# Patient Record
Sex: MALE | Race: WHITE | NOT HISPANIC OR LATINO | Employment: OTHER | URBAN - METROPOLITAN AREA
[De-identification: names, ages, dates, MRNs, and addresses within clinical notes are randomized per-mention and may not be internally consistent; named-entity substitution may affect disease eponyms.]

---

## 2018-06-28 ENCOUNTER — TRANSFERRED RECORDS (OUTPATIENT)
Dept: HEALTH INFORMATION MANAGEMENT | Facility: CLINIC | Age: 28
End: 2018-06-28

## 2018-07-03 ENCOUNTER — TRANSFERRED RECORDS (OUTPATIENT)
Dept: HEALTH INFORMATION MANAGEMENT | Facility: CLINIC | Age: 28
End: 2018-07-03

## 2018-07-03 LAB
ALT SERPL-CCNC: 88 IU/L (ref 6–40)
AST SERPL-CCNC: 30 IU/L (ref 10–40)
CREAT SERPL-MCNC: 0.98 MG/DL (ref 0.7–1.2)
GLUCOSE SERPL-MCNC: 110 MG/DL (ref 70–100)
POTASSIUM SERPL-SCNC: 4.2 MEQ/L (ref 3.4–5.1)
TSH SERPL-ACNC: 2.07 UIU/ML (ref 0.4–3.99)

## 2018-07-04 ENCOUNTER — HOSPITAL ENCOUNTER (INPATIENT)
Facility: HOSPITAL | Age: 28
LOS: 6 days | Discharge: PSYCHIATRIC OR SUBSTANCE USE FACILITY | End: 2018-07-10
Attending: PSYCHIATRY & NEUROLOGY | Admitting: PSYCHIATRY & NEUROLOGY
Payer: MEDICAID

## 2018-07-04 PROBLEM — Z04.6 INVOLUNTARY COMMITMENT: Status: ACTIVE | Noted: 2018-07-04

## 2018-07-04 PROCEDURE — 12400011

## 2018-07-04 PROCEDURE — 12400000 ZZH R&B MH

## 2018-07-04 PROCEDURE — 25000132 ZZH RX MED GY IP 250 OP 250 PS 637: Performed by: NURSE PRACTITIONER

## 2018-07-04 RX ORDER — ALUMINA, MAGNESIA, AND SIMETHICONE 2400; 2400; 240 MG/30ML; MG/30ML; MG/30ML
30 SUSPENSION ORAL EVERY 4 HOURS PRN
Status: DISCONTINUED | OUTPATIENT
Start: 2018-07-04 | End: 2018-07-10 | Stop reason: HOSPADM

## 2018-07-04 RX ORDER — HYDROXYZINE HYDROCHLORIDE 25 MG/1
25-50 TABLET, FILM COATED ORAL EVERY 4 HOURS PRN
Status: DISCONTINUED | OUTPATIENT
Start: 2018-07-04 | End: 2018-07-10 | Stop reason: HOSPADM

## 2018-07-04 RX ORDER — OLANZAPINE 10 MG/1
10 TABLET ORAL 3 TIMES DAILY PRN
Status: DISCONTINUED | OUTPATIENT
Start: 2018-07-04 | End: 2018-07-10 | Stop reason: HOSPADM

## 2018-07-04 RX ORDER — QUETIAPINE FUMARATE 50 MG/1
50 TABLET, FILM COATED ORAL 3 TIMES DAILY PRN
Status: DISCONTINUED | OUTPATIENT
Start: 2018-07-04 | End: 2018-07-10 | Stop reason: HOSPADM

## 2018-07-04 RX ORDER — LEVOTHYROXINE SODIUM 25 UG/1
25 TABLET ORAL DAILY
Status: DISCONTINUED | OUTPATIENT
Start: 2018-07-04 | End: 2018-07-10 | Stop reason: HOSPADM

## 2018-07-04 RX ORDER — ACETAMINOPHEN 325 MG/1
650 TABLET ORAL EVERY 4 HOURS PRN
Status: DISCONTINUED | OUTPATIENT
Start: 2018-07-04 | End: 2018-07-10 | Stop reason: HOSPADM

## 2018-07-04 RX ORDER — ALBUTEROL SULFATE 90 UG/1
1 AEROSOL, METERED RESPIRATORY (INHALATION) EVERY 4 HOURS PRN
COMMUNITY

## 2018-07-04 RX ORDER — OLANZAPINE 10 MG/2ML
10 INJECTION, POWDER, FOR SOLUTION INTRAMUSCULAR 3 TIMES DAILY PRN
Status: DISCONTINUED | OUTPATIENT
Start: 2018-07-04 | End: 2018-07-10 | Stop reason: HOSPADM

## 2018-07-04 RX ORDER — ALBUTEROL SULFATE 90 UG/1
1 AEROSOL, METERED RESPIRATORY (INHALATION) EVERY 4 HOURS PRN
Status: DISCONTINUED | OUTPATIENT
Start: 2018-07-04 | End: 2018-07-10 | Stop reason: HOSPADM

## 2018-07-04 RX ORDER — BENZTROPINE MESYLATE 1 MG/1
1 TABLET ORAL 2 TIMES DAILY PRN
Status: DISCONTINUED | OUTPATIENT
Start: 2018-07-04 | End: 2018-07-10 | Stop reason: HOSPADM

## 2018-07-04 RX ADMIN — VITAMIN D, TAB 1000IU (100/BT) 2000 UNITS: 25 TAB at 12:47

## 2018-07-04 RX ADMIN — ACETAMINOPHEN 650 MG: 325 TABLET, FILM COATED ORAL at 02:01

## 2018-07-04 RX ADMIN — LEVOTHYROXINE SODIUM 25 MCG: 25 TABLET ORAL at 12:47

## 2018-07-04 ASSESSMENT — ACTIVITIES OF DAILY LIVING (ADL)
SWALLOWING: 0-->SWALLOWS FOODS/LIQUIDS WITHOUT DIFFICULTY
TOILETING: 0-->INDEPENDENT
COGNITION: 0 - NO COGNITION ISSUES REPORTED
SWALLOWING: 0-->SWALLOWS FOODS/LIQUIDS WITHOUT DIFFICULTY
GROOMING: INDEPENDENT
DRESS: 0-->INDEPENDENT
COGNITION: 0 - NO COGNITION ISSUES REPORTED
GROOMING: INDEPENDENT
TRANSFERRING: 0-->INDEPENDENT
DRESS: SCRUBS (BEHAVIORAL HEALTH)
DRESS: 0-->INDEPENDENT
LAUNDRY: UNABLE TO COMPLETE
AMBULATION: 0-->INDEPENDENT
DRESS: INDEPENDENT
TOILETING: 0-->INDEPENDENT
ORAL_HYGIENE: INDEPENDENT
AMBULATION: 0-->INDEPENDENT
RETIRED_COMMUNICATION: 0-->UNDERSTANDS/COMMUNICATES WITHOUT DIFFICULTY
BATHING: 0-->INDEPENDENT
TRANSFERRING: 0-->INDEPENDENT
BATHING: 0-->INDEPENDENT
ORAL_HYGIENE: INDEPENDENT
LAUNDRY: UNABLE TO COMPLETE
FALL_HISTORY_WITHIN_LAST_SIX_MONTHS: NO
RETIRED_EATING: 0-->INDEPENDENT
RETIRED_COMMUNICATION: 0-->UNDERSTANDS/COMMUNICATES WITHOUT DIFFICULTY
RETIRED_EATING: 0-->INDEPENDENT

## 2018-07-04 NOTE — IP AVS SNAPSHOT
[facility]    [address] [place] [id number]    Phone:  [phone]    Fax:  [phone]                                       After Visit Summary   7/4/2018    Arik Arredondo    MRN: [id number]           After Visit Summary Signature Page     I have received my discharge instructions, and my questions have been answered. I have discussed any challenges I see with this plan with the nurse or doctor.    ..........................................................................................................................................  Patient/Patient Representative Signature      ..........................................................................................................................................  Patient Representative Print Name and Relationship to Patient    ..................................................               ................................................  Date                                            Time    ..........................................................................................................................................  Reviewed by Signature/Title    ...................................................              ..............................................  Date                                                            Time
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
## 2018-07-04 NOTE — PLAN OF CARE
Face to face end of shift report received from Aleksander HASTINGS RN. Rounding completed. Patient observed in bed awake.     Una Menjivar  7/4/2018  4:03 PM

## 2018-07-04 NOTE — IP AVS SNAPSHOT
MRN:[id number]                      After Visit Summary   7/4/2018    Arik Arredondo    MRN: [id number]           Thank you!     Thank you for choosing [facility] for your care. Our goal is always to provide you with excellent care. Hearing back from our patients is one way we can continue to improve our services. Please take a few minutes to complete the written survey that you may receive in the mail after you visit with us. Thank you!        Patient Information     Date Of Birth          1990        Designated Caregiver       Most Recent Value    Caregiver    Will someone help with your care after discharge? yes    Name of designated caregiver Facility    Phone number of caregiver not sure at this point    Caregiver address unsure      About your hospital stay     You were admitted on:  July 4, 2018 You last received care in the: [facility]    You were discharged on:  July 10, 2018       Who to Call     For medical emergencies, please call 911.  For non-urgent questions about your medical care, please call your primary care provider or clinic, None          Attending Provider     Provider Specialty    Luis Cantu MD Psychiatry    Joann Moon NP Nurse Practitioner       Primary Care Provider Fax #    Physician No Ref-Primary [phone]      Further instructions from your care team       Behavioral Discharge Planning and Instructions    Summary: Arik was admitted to  after a revocation of his provisional discharge     Main Diagnosis: Schizoaffective disorder- bipolar type (by history), R/o substance-induced psychosis, Hx of opioid use disorder, Hx of marijuana use disorder    Major Treatments, Procedures and Findings: Stabilize with medications, connect with community programs.    Symptoms to Report: feeling more aggressive, increased confusion, losing more sleep, mood getting worse or thoughts of suicide    Lifestyle Adjustment: Take all medications as prescribed,  meet with doctor/ medication provider, out patient therapist, , and [facility] worker as scheduled. Abstain from alcohol or any unprescribed drugs.    IF YOU HAVE ANY QUESTIONS/CONCERNS REGARDING YOUR RECENT STAY AT [facility] PLEASE CALL [phone] AND ASK FOR A NURSE.    Psychiatry Follow-up:     [facility]  - Michelle - [phone] ext [phone]     - Heather Penfield  [address] [place] [address]  Phone: [phone]  Fax: [phone]    Resources:   Crisis Intervention: 344.316.8600 or 891-374-4156 (TTY: 158.790.8667).  Call anytime for help.  National [place] on Mental Illness (www.mn.héctor.org): 645.936.1057 or 152-773-9129.  Alcoholics Anonymous (www.alcoholics-anonymous.org): Check your phone book for your local chapter.  Suicide Awareness Voices of Education (SAVE) (www.save.org): 192-015-LRMX (9744)  National Suicide Prevention Line (www.mentalhealthmn.org): 512-277-GTDB (6184)  Mental Health Consumer/Survivor Network of [place] (www.mhcsn.net): 911.255.3285 or 389-193-3762  Mental Health Association of [place] (www.mentalhealth.org): 276.246.2088 or 001-152-8818    General Medication Instructions:   See your medication sheet(s) for instructions.   Take all medicines as directed.  Make no changes unless your doctor suggests them.   Go to all your doctor visits.  Be sure to have all your required lab tests. This way, your medicines can be refilled on time.  Do not use any drugs not prescribed by your doctor.  Avoid alcohol.      Pending Results     No orders found from 7/2/2018 to 7/5/2018.            Statement of Approval     Ordered          07/09/18 1239  I have reviewed and agree with all the recommendations and orders detailed in this document.  EFFECTIVE NOW     Approved and electronically signed by:  Joann Moon NP             Admission Information     Date & Time Provider Department Dept. Phone    7/4/2018 Red  "D[facility] [phone]      Your Vitals Were     Blood Pressure Pulse Temperature Respirations Weight Pulse Oximetry    129/82 88 96.9  F (36.1  C) (Tympanic) 18 133.5 kg (294 lb 6.4 oz) 97%      MyChart Information     [employer] lets you send messages to your doctor, view your test results, renew your prescriptions, schedule appointments and more. To sign up, go to [email or web address] . Click on \"Log in\" on the left side of the screen, which will take you to the Welcome page. Then click on \"Sign up Now\" on the right side of the page.     You will be asked to enter the access code listed below, as well as some personal information. Please follow the directions to create your username and password.     Your access code is: [email or web address]  Expires: 10/8/2018  5:46 AM     Your access code will  in 90 days. If you need help or a new code, please call your [facility] clinic or [phone].        Care EveryWhere ID     This is your Care EveryWhere ID. This could be used by other organizations to access your [facility] medical records  [id number]        Equal Access to Services     ENDER PUGH : Bakari Valdez, wasalvadorda alex, qaybta kaalmada adejeimyyada, narciso cho. So [facility] [phone].    ATENCIÓN: Si habla español, tiene a adams disposición servicios gratuitos de asistencia lingüística. Llame al [phone].    We comply with applicable federal civil rights laws and [place] laws. We do not discriminate on the basis of race, color, national origin, age, disability, sex, sexual orientation, or gender identity.               Review of your medicines      CONTINUE these medicines which may have CHANGED, or have new prescriptions. If we are uncertain of the size of tablets/capsules you have at home, strength may be listed as something that might have changed.        Dose / Directions    benztropine 0.5 MG tablet   Commonly known as:  COGENTIN "   Indication:  bipolar disorder in partial remission   This may have changed:    - medication strength  - when to take this  - reasons to take this        Dose:  1 mg   Take 2 tablets (1 mg) by mouth 2 times daily as needed   Refills:  0       hydrOXYzine 25 MG capsule   Commonly known as:  VISTARIL   Indication:  Anxiety   This may have changed:    - when to take this  - reasons to take this        Dose:  50 mg   Take 2 capsules (50 mg) by mouth every 4 hours as needed   Quantity:  120 capsule   Refills:  0       risperiDONE microspheres 25 MG injection   Commonly known as:  risperDAL CONSTA   This may have changed:  additional instructions        Dose:  25 mg   Inject 2 mLs (25 mg) into the muscle every 14 days Last received 7/6/18   Quantity:  2 each   Refills:  0       SEROQUEL 50 MG tablet   This may have changed:    - medication strength  - when to take this  - reasons to take this   Generic drug:  QUEtiapine        Dose:  50 mg   Take 1 tablet (50 mg) by mouth 3 times daily as needed   Quantity:  120 tablet   Refills:  0         CONTINUE these medicines which have NOT CHANGED        Dose / Directions    albuterol 108 (90 Base) MCG/ACT Inhaler   Commonly known as:  PROAIR HFA/PROVENTIL HFA/VENTOLIN HFA        Dose:  1 puff   Inhale 1 puff into the lungs every 4 hours as needed for shortness of breath / dyspnea   Refills:  0       LEVOTHROID PO   Indication:  Subclinical hypothyroid        Dose:  25 mcg   Take 25 mcg by mouth   Refills:  0       VITAMIN D (CHOLECALCIFEROL) PO        Dose:  2000 Units   Take 2,000 Units by mouth daily   Refills:  0                Protect others around you: Learn how to safely use, store and throw away your medicines at www.disposemymeds.org.             Medication List: This is a list of all your medications and when to take them. Check marks below indicate your daily home schedule. Keep this list as a reference.      Medications           Morning Afternoon Evening Bedtime As  Needed    albuterol 108 (90 Base) MCG/ACT Inhaler   Commonly known as:  PROAIR HFA/PROVENTIL HFA/VENTOLIN HFA   Inhale 1 puff into the lungs every 4 hours as needed for shortness of breath / dyspnea   Last time this was given:  1 puff on 7/7/2018  8:25 PM                                benztropine 0.5 MG tablet   Commonly known as:  COGENTIN   Take 2 tablets (1 mg) by mouth 2 times daily as needed                                hydrOXYzine 25 MG capsule   Commonly known as:  VISTARIL   Take 2 capsules (50 mg) by mouth every 4 hours as needed                                LEVOTHROID PO   Take 25 mcg by mouth   Last time this was given:  25 mcg on 7/10/2018  6:43 AM                                risperiDONE microspheres 25 MG injection   Commonly known as:  risperDAL CONSTA   Inject 2 mLs (25 mg) into the muscle every 14 days Last received 7/6/18   Last time this was given:  25 mg on 7/6/2018  8:49 AM                                SEROQUEL 50 MG tablet   Take 1 tablet (50 mg) by mouth 3 times daily as needed   Generic drug:  QUEtiapine                                VITAMIN D (CHOLECALCIFEROL) PO   Take 2,000 Units by mouth daily   Last time this was given:  2,000 Units on 7/9/2018  8:27 AM

## 2018-07-04 NOTE — PLAN OF CARE
"Problem: Patient Care Overview  Goal: Individualization & Mutuality  Pt will sleep 6-8 hours per night by 7/6/18.  Pt will follow treatment team recommendations while on the unit.  Patient may wean to open unit as long as behavior remains in control.      Outcome: Improving  Sleepy early in shift then weaning without issue. Pt insists that he did not do opiates and states \"how could I have done any drugs I was unable to leave the facility after the positive marijuana test. Pt anxious about results of drug test that was sent to [facility] to try and prove he did not do any drugs. Pt requesting to go through his phone to get numbers off phone. Security brought phone up to unit and pt informed he can look through with staff next shift. Pt med compliant, calm, polite and appropriate. PTA med list complete but need to call pt pharmacy to find out when was the last time pt was given Risperdal Consta shot.  CALL: [facility] called this AM. Heather Penfield [phone] chem. dependency counselor, [facility], State facility like [facility] might be good per Nathaly. Pt needs more intensive programming, [facility] may not work, Nathaly states they are also open to what our facility feels appropriate for pt. Pts [facility] is caring, involved, sets good boundaries and if calls or visits is good support for pt.     Problem: Violence, Risk/Actual (Adult)  Goal: Impulse Control  Patient will not act out impulsively while on the unit.    Outcome: Improving  Pt has been calm and compliant.  Goal: Anger Control  Patient will be free from any acts of aggression while on the unit.    Outcome: Improving  Pt has not had signs of aggression. Pt has been calm and compliant.      "

## 2018-07-04 NOTE — PROGRESS NOTES
07/04/18 0428   Patient Belongings   Did you bring any home meds/supplements to the hospital?  Yes   Disposition of meds  Sent to security/pharmacy per site process   Patient Belongings cell phone/electronics;clothing;keys;watch;shoes;Amish item;necklace;money (see comment)   Disposition of Belongings Locker;Sent to security per site process   Belongings Search Yes   Clothing Search Yes   Second Staff Wei   General Info Comment Brown Towel, Red Boxer Briefs, 18 White Socks, Grey Tank Top, 4 Black Shorts, Blue TShirt, 4 Black TShirts, 5 Black Tank Tops, 6 Grey Boxers, Blue Tank Top, 2 Purple Towels, Blue Towel, 2  Steelers Jersey Tank Tops, Gold Shorts, Perfect AB Crane, 5 lbs of Whey Powder, Black Comb, Red TShirt, 6 Black Boxer Briefs,  BLack Air Jordans, Silver Colored Sun Glasses, Black , Beats Case, Court Papers, Dialectical Behavior Therapy Skills Workbook, Misc Papers, 2 Containers of Preworkout, Steelers Winter Hat, Black Sketchers, 32 oz Suave Lotion, White Rain Soap, Old Spice Deoderant, Baby Powder, Adidas Carlock, 2 Head and Shoulders, Banana Boat Sunscreen, Speedstick, 3 Disc Golf Discs, 3 Exercise Bands, Yellow Toothbrush in Case, Ear Buds, Short Cord, Appetite Surpressing Chews, White Air Jordans, Vaseline, Unopened Deck of Cards, Soduku Book, Planner, Extension Cord, 2 Disposable Razors, 2 Electric Razors, Navy Blue Sweatshirt with Putty on it, Brown Mt Sweatshirt, Green Tub, Blue Nikes, ALoe Lotion, 2 Colgate Toothpastes, Degree Deoderant, Black Sweatshirt, 2 Grey TShirts, 3 Black Sweats, Steelers Sweats, White Shorts, NFL Steelers Shirt, Steelers Jersey, 3 Black Socks, [employer] Champions TShirt, Purple Hand Towel, Purple Wash Cloth, Blue Boxer Briefs, QTips, Blue TShirt, 2 PEns, Back Massager, 2 Black Suitcases, Red TShirt, Steelvinicio PJ Pants, White Dress Shirt, Rock Revival Ripped Jeans, Grey Shorts, White Hankerchef, Dark Grey Cargo Shorts, Key on Black Lanyard    List items  sent to safe: Silver Colored Iphone In black Case, Silver Colored Cross on Gold Colored Chain, Black Watch, Beats Ear Buds, $4.53  All other belongings put in assigned cubby in belongings room.     I have reviewed my belongings list on admission and verify that it is correct.     Patient signature_______________________________    Second staff witness (if patient unable to sign) ______________________________       I have received all my belongings at discharge.    Patient signature________________________________    Kamini   7/4/2018  4:42 AM

## 2018-07-05 PROCEDURE — 12400000 ZZH R&B MH

## 2018-07-05 PROCEDURE — 25000132 ZZH RX MED GY IP 250 OP 250 PS 637: Performed by: NURSE PRACTITIONER

## 2018-07-05 PROCEDURE — 12400011

## 2018-07-05 RX ADMIN — VITAMIN D, TAB 1000IU (100/BT) 2000 UNITS: 25 TAB at 08:18

## 2018-07-05 RX ADMIN — LEVOTHYROXINE SODIUM 25 MCG: 25 TABLET ORAL at 06:15

## 2018-07-05 ASSESSMENT — ACTIVITIES OF DAILY LIVING (ADL)
ORAL_HYGIENE: INDEPENDENT
DRESS: INDEPENDENT;SCRUBS (BEHAVIORAL HEALTH)
GROOMING: INDEPENDENT
LAUNDRY: UNABLE TO COMPLETE
GROOMING: INDEPENDENT
ORAL_HYGIENE: INDEPENDENT
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
## 2018-07-05 NOTE — PLAN OF CARE
Face to face end of shift report received from Genesis DUMONT RN. Rounding completed. Patient observed.     Katlin [place]  7/5/2018  3:25 PM

## 2018-07-05 NOTE — PLAN OF CARE
Problem: Patient Care Overview  Goal: Discharge Needs Assessment  Outcome: No Change  Left a message with pt , Nathaly, but she is out of the office until tomorrow.

## 2018-07-05 NOTE — PLAN OF CARE
Problem: Patient Care Overview  Goal: Individualization & Mutuality  Pt will sleep 6-8 hours per night by 7/6/18.  Pt will follow treatment team recommendations while on the unit.  Patient may wean to open unit as long as behavior remains in control.      Pt has isolated to room much of this shift. He denies SI, HI, and hallucinations. Is fixated on UA and asks all staff if they have heard anything. Says he was given a lemon poppy seed muffin prior to test and believes this is why he was positive for opioids. Pt denies anxiety and depression. Says he plans on returning to IRTS program in [facility] when discharged. Pt is compliant with prescribed medication. No complaints of pain. Will continue to monitor. 1000: Spoke with IRTS program that verified pt last received Risperdal Consta 6/22/18 and will be due tomorrow 7/6/18. He gets 25 mg every 2 weeks.   1250: Pt continues to ask about toxicology from 6/28/18. This writer did call [facility] and spoke with the lab. They did confirm that it was sent out to [facility] in the [facility]. [place] called them to see when results would in and it was completed on 7/3. [place] advised to watch for the results. She was not aware of the results at this time. Patient was notified.     Problem: Violence, Risk/Actual (Adult)  Goal: Impulse Control  Patient will not act out impulsively while on the unit.    Pt has remained in control of behaviors  Goal: Anger Control  Patient will be free from any acts of aggression while on the unit.    No aggressive behavior displayed

## 2018-07-05 NOTE — PLAN OF CARE
Face to face end of shift report received from pm RN. Rounding completed. Patient observed.     Jabier Dunn  7/4/2018  11:50 PM

## 2018-07-05 NOTE — PLAN OF CARE
Face to face end of shift report received from AUTUMN Eugene. Rounding completed. Patient observed in bed.     Genesis Razo  7/5/2018  7:38 AM

## 2018-07-05 NOTE — PLAN OF CARE
Problem: Patient Care Overview  Goal: Individualization & Mutuality  Pt will sleep 6-8 hours per night by 7/6/18.  Pt will follow treatment team recommendations while on the unit.  Patient may wean to open unit as long as behavior remains in control.      Outcome: No Change  Pt came up to nurses station with multiple questions regarding further plans after hospitalization here and lab results that were sent . Asked writer what and where [facility] are. Spends time in lounge and on the phone. Does not appear to interact much with peers on the unit. Cooperative and pleasant. Mild anxiety noted but has not requested any medications.

## 2018-07-05 NOTE — PLAN OF CARE
Problem: Patient Care Overview  Goal: Individualization & Mutuality  Pt will sleep 6-8 hours per night by 7/6/18.  Pt will follow treatment team recommendations while on the unit.  Patient may wean to open unit as long as behavior remains in control.      Outcome: Improving  Patient has been calm, cooperative, and friendly with peers and staff this shift.  He weaned to the open unit much of the day but did not attend many groups.  He has been very concerned about his UA results and has asked every staff on the unit what they know about how lab testing is done.  He was also concerned about where he would be discharging to.  This writer spoke with patient and explained that we need more time to evaluate him before discharge plans are worked on.  Patient verbalized understanding and was appreciative of information.  He denies all mental health issues but admits the UA results are causing him some anxiety. No complaints of pain.  VS WNL.     Problem: Violence, Risk/Actual (Adult)  Goal: Impulse Control  Patient will not act out impulsively while on the unit.    Outcome: Improving  Patient has remained in control of impulses this shift.   Goal: Anger Control  Patient will be free from any acts of aggression while on the unit.    Outcome: Improving  Patient has not been aggressive this shift.

## 2018-07-06 PROCEDURE — 25000132 ZZH RX MED GY IP 250 OP 250 PS 637: Performed by: NURSE PRACTITIONER

## 2018-07-06 PROCEDURE — 12400000 ZZH R&B MH

## 2018-07-06 PROCEDURE — 12400011

## 2018-07-06 PROCEDURE — 25000128 H RX IP 250 OP 636: Performed by: NURSE PRACTITIONER

## 2018-07-06 RX ADMIN — RISPERIDONE 25 MG: KIT at 08:49

## 2018-07-06 RX ADMIN — VITAMIN D, TAB 1000IU (100/BT) 2000 UNITS: 25 TAB at 08:01

## 2018-07-06 RX ADMIN — LEVOTHYROXINE SODIUM 25 MCG: 25 TABLET ORAL at 06:04

## 2018-07-06 ASSESSMENT — ACTIVITIES OF DAILY LIVING (ADL)
GROOMING: INDEPENDENT
GROOMING: INDEPENDENT
ORAL_HYGIENE: INDEPENDENT

## 2018-07-06 NOTE — PLAN OF CARE
Problem: Patient Care Overview  Goal: Individualization & Mutuality  Pt will sleep 6-8 hours per night by 7/6/18.  Pt will follow treatment team recommendations while on the unit.  Patient may wean to open unit as long as behavior remains in control.      Outcome: Improving  Pt has been going to groups, denies SI, low depression and anxiety all related to awaiting the detailed lab results. Writer made phone call to [facility] talked to the labs who contacted the medical review officer who will be faxing results to here. As of 2:08 PM no results faxed here. Pt has been med compliant, polite and cooperative with staff. Pt continues to be stressed and anxiously waiting the lab results from [facility]. Pt insists that he has not done any drugs and is hopeful the detailed results will prove his case. Writer did notice in the [facility] chart the 7/3 labs are negative for any drugs.     Problem: Violence, Risk/Actual (Adult)  Goal: Impulse Control  Patient will not act out impulsively while on the unit.    Outcome: Improving  Pt has been in control.  Goal: Anger Control  Patient will be free from any acts of aggression while on the unit.    Outcome: Improving  Pt has had no signs of aggression.

## 2018-07-06 NOTE — PLAN OF CARE
Face to face end of shift report received from pm RN. Rounding completed. Patient observed.     Jabier Dunn  7/5/2018  11:47 PM

## 2018-07-06 NOTE — PLAN OF CARE
Problem: Patient Care Overview  Goal: Team Discussion  Team Plan:   BEHAVIORAL TEAM DISCUSSION    Participants: Edna Rao NP, Joann Moon NP, Marifer De Leon NP,  Sheeba Sims LICSW, Carina Smith LSW,  Lupe Duff LSW, Elizabeth Cason RN, Adria Villalobos RN. Vivienne Beebe OT.   Progress: Minimal: attends some groups.   Continued Stay Criteria/Rationale: Consta injection today.   Medical/Physical: checking on lab results for [place].   Precautions:   Behavioral Orders   Procedures     Code 1 - Restrict to Unit     Routine Programming     As clinically indicated     Self Injury Precaution     Bathroom door to remain locked until after provider evaluation     Status 15     Every 15 minutes.     Plan: DC to [facility]  Rationale for change in precautions or plan: none

## 2018-07-06 NOTE — PLAN OF CARE
"Problem: Patient Care Overview  Goal: Discharge Needs Assessment  Outcome: No Change  Spoke with pt , Michelle, who stated that pt is always hard to redirect and is a \"tough one\" states pt will go on a tangent about his lab results and will say whatever to make him seem right. She stated that she believes a [facility] will be the best option for pt and not a CARE program not back to an [facility]. She is going to contact [facility] to see about pt status - [facility]PA requested all information for pt - will fax. She stated Nathaly is pt's chemical  and she is his mental health  as yesterday pt complained and stated his  (Nathaly) didn't spend enough time with him and shouldn't be making recommendations for him - pt did not speak of Michelle at all during assessment.       "

## 2018-07-06 NOTE — PLAN OF CARE
Face to face end of shift report received from Aleksander RN. Rounding completed. Patient observed.     Katlin Colorado  7/6/2018  3:32 PM

## 2018-07-06 NOTE — PLAN OF CARE
Face to face end of shift report received from AUTUMN Eugene. Rounding completed. Patient observed in bed, sleeping.     Genesis Razo  7/6/2018  7:40 AM
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
## 2018-07-06 NOTE — PROGRESS NOTES
"[facility]  Psychiatric Progress Note      Impression:    This is a 28 year old yo male with a history of schizoaffective disorder and substance abuse.    Arik is sitting up in the lounge when I see him today. He tells me that he is \"doing alright\". He then starts talking about the urine drug screen that he is waiting for confirmation about. Again denies any drug use, stating that he had no access to any sort of drug. He states that the test will come back showing that that he did indeed eat poppy seed bagels that resulted in a positive opiate drug test. Denies any suicidal ideation, states that he hopes he can go back to the IRTS after he \"proves them wrong\". Discussed that plan is likely [facility], though he states that he will wait to get the results back to show everyone that he was telling the truth. Sleeping well. Received Risperdal Consta injection today.    Educated regarding medication indications, risks, benefits, side effects, contraindications and possible interactions. Verbally expressed understanding.        DIagnoses:   Schizoaffective disorder- bipolar type (by history)  R/o substance-induced psychosis  Hx of opioid use disorder  Hx of marijuana use disorder      Attestation:  Patient has been seen and evaluated by me,  Joann Moon NP          Interim History:   The patient's care was discussed with the treatment team and chart notes were reviewed.          Medications:     Current Facility-Administered Medications Ordered in Epic   Medication Dose Route Frequency Last Rate Last Dose     acetaminophen (TYLENOL) tablet 650 mg  650 mg Oral Q4H PRN   650 mg at 07/04/18 0201     albuterol (PROAIR HFA/PROVENTIL HFA/VENTOLIN HFA) Inhaler 1 puff  1 puff Inhalation Q4H PRN         alum & mag hydroxide-simethicone (MYLANTA ES/MAALOX  ES) suspension 30 mL  30 mL Oral Q4H PRN         benztropine (COGENTIN) tablet 1 mg  1 mg Oral BID PRN         cholecalciferol (vitamin D3) tablet 2,000 Units  " 2,000 Units Oral Daily   2,000 Units at 07/06/18 0801     hydrOXYzine (ATARAX) tablet 25-50 mg  25-50 mg Oral Q4H PRN         levothyroxine (SYNTHROID/LEVOTHROID) tablet 25 mcg  25 mcg Oral Daily   25 mcg at 07/06/18 0604     magnesium hydroxide (MILK OF MAGNESIA) suspension 30 mL  30 mL Oral At Bedtime PRN         nicotine polacrilex (NICORETTE) gum 2-4 mg  2-4 mg Buccal Q1H PRN         OLANZapine (zyPREXA) tablet 10 mg  10 mg Oral TID PRN        Or     OLANZapine (zyPREXA) injection 10 mg  10 mg Intramuscular TID PRN         QUEtiapine (SEROquel) tablet 50 mg  50 mg Oral TID PRN         risperiDONE microspheres (risperDAL CONSTA) injection 25 mg  25 mg Intramuscular Q14 Days   25 mg at 07/06/18 0849     No current Epic-ordered outpatient prescriptions on file.          10 point ROS reviewed- denies any current concerns       Allergies:   No Known Allergies         Psychiatric Examination:   /67  Pulse 67  Temp 97  F (36.1  C) (Tympanic)  Resp 18  SpO2 93%  Weight is 0 lbs 0 oz  There is no height or weight on file to calculate BMI.    Appearance:  awake, alert, adequately groomed, dressed in hospital scrubs and appeared as age stated  Attitude:  cooperative, pleasant  Eye Contact:  good  Mood:  anxious, nervous  Affect:  Mood congruent  Speech:  clear, coherent  Psychomotor Behavior:  no evidence of tardive dyskinesia, dystonia, or tics  Thought Process:  linear and goal oriented, rationalizing, obsessive and perseverative  Associations:  no loose associations  Thought Content:  no evidence of suicidal ideation or homicidal ideation and no evidence of psychotic thought  Insight:  limited  Judgment:  fair  Oriented to:  time, person, and place  Attention Span and Concentration:  intact  Recent and Remote Memory:  intact  Fund of Knowledge: low-normal  Muscle Strength and Tone: normal  Gait and Station: Normal           Labs:   No results found for this or any previous visit (from the past 24  hour(s)).           Plan:   Risperdal Consta 25 mg IM due today and then every 2 weeks  Continue other medications  Provisional discharge revoked  [facility] at discharge when bed available    ELOS: likely > 5 days, will go to [facility] as he has failed less restrictive options

## 2018-07-07 PROCEDURE — 25000132 ZZH RX MED GY IP 250 OP 250 PS 637: Performed by: NURSE PRACTITIONER

## 2018-07-07 PROCEDURE — 12400000 ZZH R&B MH

## 2018-07-07 PROCEDURE — 12400011

## 2018-07-07 RX ADMIN — ALBUTEROL SULFATE 1 PUFF: 90 AEROSOL, METERED RESPIRATORY (INHALATION) at 20:25

## 2018-07-07 RX ADMIN — LEVOTHYROXINE SODIUM 25 MCG: 25 TABLET ORAL at 06:04

## 2018-07-07 RX ADMIN — VITAMIN D, TAB 1000IU (100/BT) 2000 UNITS: 25 TAB at 08:22

## 2018-07-07 ASSESSMENT — ACTIVITIES OF DAILY LIVING (ADL)
DRESS: SCRUBS (BEHAVIORAL HEALTH)
GROOMING: INDEPENDENT
GROOMING: INDEPENDENT
LAUNDRY: UNABLE TO COMPLETE
ORAL_HYGIENE: INDEPENDENT

## 2018-07-07 NOTE — PLAN OF CARE
Face to face end of shift report received from Jabier BLUE RN. Rounding completed. Patient observed in bed asleep.     Una Menjivar  7/7/2018  8:57 AM

## 2018-07-07 NOTE — PROGRESS NOTES
"[facility]  Psychiatric Progress Note      Impression:    This is a 28 year old yo male with a history of schizoaffective disorder and substance abuse.    Arik is up in the lounge talking with peers when I see his this afternoon. He tells me that he received the lab results he had been waiting for, which indicated that poppy seeds could not be ruled out. He states \"now I will be able to prove them all wrong\". He states that he contacted his  to tell her this yesterday. He states \"she is not on my side. I tried to get a different one but I guess she is the only one in [facility]\". Behavior has been appropriate and cooperative. Feels that he has been sleeping well. Did receive his Risperdal Consta injection yesterday. He denies any other concerns though verbalizes that he would like to go back to the placement that he was just at and not go to a [facility] or inpatient treatment.     Educated regarding medication indications, risks, benefits, side effects, contraindications and possible interactions. Verbally expressed understanding.        DIagnoses:   Schizoaffective disorder- bipolar type (by history)  R/o substance-induced psychosis  Hx of opioid use disorder  Hx of marijuana use disorder      Attestation:  Patient has been seen and evaluated by me,  Joann Moon NP          Interim History:   The patient's care was discussed with the treatment team and chart notes were reviewed.          Medications:     Current Facility-Administered Medications Ordered in Epic   Medication Dose Route Frequency Last Rate Last Dose     acetaminophen (TYLENOL) tablet 650 mg  650 mg Oral Q4H PRN   650 mg at 07/04/18 0201     albuterol (PROAIR HFA/PROVENTIL HFA/VENTOLIN HFA) Inhaler 1 puff  1 puff Inhalation Q4H PRN         alum & mag hydroxide-simethicone (MYLANTA ES/MAALOX  ES) suspension 30 mL  30 mL Oral Q4H PRN         benztropine (COGENTIN) tablet 1 mg  1 mg Oral BID PRN         cholecalciferol " (vitamin D3) tablet 2,000 Units  2,000 Units Oral Daily   2,000 Units at 07/06/18 0801     hydrOXYzine (ATARAX) tablet 25-50 mg  25-50 mg Oral Q4H PRN         levothyroxine (SYNTHROID/LEVOTHROID) tablet 25 mcg  25 mcg Oral Daily   25 mcg at 07/06/18 0604     magnesium hydroxide (MILK OF MAGNESIA) suspension 30 mL  30 mL Oral At Bedtime PRN         nicotine polacrilex (NICORETTE) gum 2-4 mg  2-4 mg Buccal Q1H PRN         OLANZapine (zyPREXA) tablet 10 mg  10 mg Oral TID PRN        Or     OLANZapine (zyPREXA) injection 10 mg  10 mg Intramuscular TID PRN         QUEtiapine (SEROquel) tablet 50 mg  50 mg Oral TID PRN         risperiDONE microspheres (risperDAL CONSTA) injection 25 mg  25 mg Intramuscular Q14 Days   25 mg at 07/06/18 0849     No current [facility]-ordered outpatient prescriptions on file.          10 point ROS reviewed- denies any current concerns       Allergies:   No Known Allergies         Psychiatric Examination:   /59  Pulse 82  Temp 97.9  F (36.6  C) (Tympanic)  Resp 18  SpO2 97%  Weight is 0 lbs 0 oz  There is no height or weight on file to calculate BMI.    Appearance: awake, alert, dressed in hospital scrubs, casually groomed  Attitude: pleasnant and cooperative  Eye Contact: good  Mood: nervous about his commitment  Affect:  Mood congruent  Speech:  clear, coherent, regular rate and rhythm though somewhat rambling  Psychomotor Behavior:  no evidence of tardive dyskinesia, dystonia, or tics  Thought Process:  linear and goal oriented, rationalizing, obsessive and perseverative  Associations:  no loose associations  Thought Content:  no evidence of suicidal ideation or homicidal ideation and no evidence of psychotic thought  Insight:  limited  Judgment:  fair  Oriented to:  time, person, and place  Attention Span and Concentration:  intact  Recent and Remote Memory:  intact  Fund of Knowledge: low-normal  Muscle Strength and Tone: normal  Gait and Station: Normal           Labs:   No results  found for this or any previous visit (from the past 24 hour(s)).           Plan:   Risperdal Consta 25 mg IM given yesterday, and then every 2 weeks  Continue other medications  Provisional discharge revoked  [facility] at discharge when bed available- is on priority list    ELOS: likely > 5 days, will go to [facility] as he has failed less restrictive options

## 2018-07-07 NOTE — PLAN OF CARE
Problem: Patient Care Overview  Goal: Plan of Care/Patient Progress Review  Outcome: Improving  Patient was calm, cooperative, and medication compliant this shift.  He isolated to his room until lunch then was social in the lounge with peers and staff.  He continues to obsess about his lab results and that he thinks people don't believe he was only eating poppyseed muffins.  He also made statements about wanting to call doctors he knows who might contact his  and help him get back to his prior placement.  Explained to patient that the team her will handle calls like that.  He denies any mental health issues.  VS WNL. No complaints of pain.     Problem: Violence, Risk/Actual (Adult)  Goal: Impulse Control  Patient will not act out impulsively while on the unit.    Outcome: Improving  Patient has remained in control of impulses this shift.   Goal: Anger Control  Patient will be free from any acts of aggression while on the unit.    Outcome: Improving  Patient has not been angry towards staff or peers this shift.

## 2018-07-07 NOTE — PLAN OF CARE
Face to face end of shift report received from pm RN. Rounding completed. Patient observed.     Jabier Dunn  7/6/2018  11:36 PM

## 2018-07-07 NOTE — PLAN OF CARE
"Problem: Patient Care Overview  Goal: Individualization & Mutuality  Pt will sleep 6-8 hours per night by 7/6/18.  Pt will follow treatment team recommendations while on the unit.  Patient may wean to open unit as long as behavior remains in control.      Outcome: No Change  Pt attending groups. Repeatedly denies taking and drugs and that poppy seeds were the problem. States \"I hope they believe that and I'd like to go back to where I was- they were cool\". Remains withdrawn and isolative to peers but will sit amongst them. Requested double portions for meals \"I'm 300lbs- I need to eat more\". Order received.    2025- Pt requested and received Albuterol inhaler c/o feeling mildly SOB. No respiratory distress noted. No coughing. Pt stable. Also wanted his shoes- laces removed.  "

## 2018-07-07 NOTE — PLAN OF CARE
Face to face end of shift report received from Una LEYVA. Rounding completed. Patient observed.     Katlin Colorado  7/7/2018  3:24 PM

## 2018-07-08 PROCEDURE — 12400011

## 2018-07-08 PROCEDURE — 12400000 ZZH R&B MH

## 2018-07-08 PROCEDURE — 25000132 ZZH RX MED GY IP 250 OP 250 PS 637: Performed by: NURSE PRACTITIONER

## 2018-07-08 RX ADMIN — VITAMIN D, TAB 1000IU (100/BT) 2000 UNITS: 25 TAB at 08:20

## 2018-07-08 RX ADMIN — LEVOTHYROXINE SODIUM 25 MCG: 25 TABLET ORAL at 05:57

## 2018-07-08 ASSESSMENT — ACTIVITIES OF DAILY LIVING (ADL)
ORAL_HYGIENE: INDEPENDENT
GROOMING: INDEPENDENT
LAUNDRY: UNABLE TO COMPLETE
DRESS: SCRUBS (BEHAVIORAL HEALTH)
GROOMING: INDEPENDENT

## 2018-07-08 NOTE — PLAN OF CARE
Face to face end of shift report received from pm RN. Rounding completed. Patient observed.     Jabier Dunn  7/7/2018  11:54 PM

## 2018-07-08 NOTE — PLAN OF CARE
"Problem: Patient Care Overview  Goal: Individualization & Mutuality  Pt will sleep 6-8 hours per night by 7/6/18.  Pt will follow treatment team recommendations while on the unit.  Patient may wean to open unit as long as behavior remains in control.      Outcome: No Change  Presently talking to NP. Continues to concentrate on same issues. Focused on lab and admit/discharge . Attending groups and plays chess with others. Able to vent feelings freely to staff, but does not interact much with peers. Pleasant and cooperative.     1700- wanted nurse to fax something to his  regarding possibility of \"adding onto the lab test to make it more specific and then maybe they'll believe me\". Accepted answer of speaking to NP tomorrow. Pt will talk about his lab issues to any staff that will listen. Describes in detail his ongoing lab result and \"but I ate poppy seeds in the muffin\". Remains in control with no outbursts - pleasantly thanks writer for efforts.  "

## 2018-07-08 NOTE — PLAN OF CARE
Face to face end of shift report received from Jabier BLUE RN. Rounding completed. Patient observed in bed asleep.     Una Menjivar  7/8/2018  8:33 AM

## 2018-07-08 NOTE — PLAN OF CARE
Face to face end of shift report received from Una LEYVA. Rounding completed. Patient observed.     Katlin Colorado  7/8/2018  3:22 PM

## 2018-07-08 NOTE — PLAN OF CARE
Problem: Patient Care Overview  Goal: Individualization & Mutuality  Pt will sleep 6-8 hours per night by 7/6/18.  Pt will follow treatment team recommendations while on the unit.  Patient may wean to open unit as long as behavior remains in control.      Outcome: Improving  Patient has been calm, cooperative, and medication compliant this shift.  He slept most of the morning then was up attending groups in the afternoon.  He made multiple phone calls trying to get people to help him explain his lab results.  Patient remains focused on labs and his discharge plan.  He denies all mental health problems other then some anxiety related to his discharge plan.  No complaints of pain.  VS WNL.     Problem: Violence, Risk/Actual (Adult)  Goal: Impulse Control  Patient will not act out impulsively while on the unit.    Outcome: Improving  Patient has remained in control of impulses.   Goal: Anger Control  Patient will be free from any acts of aggression while on the unit.    Outcome: Improving  Patient has not shown any anger or aggression this shift.

## 2018-07-09 PROCEDURE — 25000132 ZZH RX MED GY IP 250 OP 250 PS 637: Performed by: NURSE PRACTITIONER

## 2018-07-09 PROCEDURE — 12400011

## 2018-07-09 PROCEDURE — 12400000 ZZH R&B MH

## 2018-07-09 RX ORDER — HYDROXYZINE PAMOATE 25 MG/1
50 CAPSULE ORAL EVERY 4 HOURS PRN
Qty: 120 CAPSULE | COMMUNITY
Start: 2018-07-09

## 2018-07-09 RX ORDER — BENZTROPINE MESYLATE 0.5 MG/1
1 TABLET ORAL 2 TIMES DAILY PRN
COMMUNITY
Start: 2018-07-09

## 2018-07-09 RX ORDER — QUETIAPINE FUMARATE 50 MG/1
50 TABLET, FILM COATED ORAL 3 TIMES DAILY PRN
Qty: 120 TABLET | COMMUNITY
Start: 2018-07-09

## 2018-07-09 RX ADMIN — LEVOTHYROXINE SODIUM 25 MCG: 25 TABLET ORAL at 06:38

## 2018-07-09 RX ADMIN — VITAMIN D, TAB 1000IU (100/BT) 2000 UNITS: 25 TAB at 08:27

## 2018-07-09 ASSESSMENT — ACTIVITIES OF DAILY LIVING (ADL)
ORAL_HYGIENE: INDEPENDENT
DRESS: SCRUBS (BEHAVIORAL HEALTH)
GROOMING: INDEPENDENT
ORAL_HYGIENE: INDEPENDENT
DRESS: SCRUBS (BEHAVIORAL HEALTH);INDEPENDENT
LAUNDRY: UNABLE TO COMPLETE
GROOMING: INDEPENDENT
LAUNDRY: UNABLE TO COMPLETE

## 2018-07-09 NOTE — PLAN OF CARE
Face to face end of shift report received from Akhil WAKEFIELD RN. Rounding completed. Patient observed in [facility].     Ashleigh Jay  7/9/2018  4:00 PM

## 2018-07-09 NOTE — PLAN OF CARE
"Problem: Patient Care Overview  Goal: Individualization & Mutuality  Pt will sleep 6-8 hours per night by 7/6/18.  Pt will follow treatment team recommendations while on the unit.        Outcome: No Change  Patient out on open unit, watching tv in lounge and attending groups. Denies all criteria. Affect is flat, mood is calm. States he is sad because he feels like he was set up with testing positive on his drug screen due to \"the staff baking poppy seed muffins that I ate, I didn't even think about it, and my nanograms for pot were negative, I had been there awhile and hadn't taken any drugs, but they wouldn't listen to me\". Has no complaints or requests at this time. Has been appropriate with staff and peers.     Problem: Violence, Risk/Actual (Adult)  Goal: Impulse Control  Patient will not act out impulsively while on the unit.    Outcome: No Change  Patient has remained in control of impulsive behaviors this shift.       "

## 2018-07-09 NOTE — PLAN OF CARE
Problem: Patient Care Overview  Goal: Individualization & Mutuality  Pt will sleep 6-8 hours per night by 7/6/18.  Pt will follow treatment team recommendations while on the unit.       Outcome: No Change  Pt is laying in bed and appears to be sleeping when nurse arrives to this shift. He is compliant with morning medication of Vitamin D but does not get up for breakfast. He states he is tired. Pt does report continued depression. He nurse encourages patient to get up out of bed for groups. Pt states he is not interested. Pt states he is going through some stuff. He then rolls over in bed to not face nurse and closes his eyes. Nurse will continue to encourage him to get up and attend groups and shower.     Problem: Violence, Risk/Actual (Adult)  Goal: Impulse Control  Patient will not act out impulsively while on the unit.    Outcome: Improving  Pt does not get up and out of bed today, nurse will continue to monitor.   Goal: Anger Control  Patient will be free from any acts of aggression while on the unit.    Outcome: No Change  Pt free from aggressive behaviors this shift.

## 2018-07-09 NOTE — PLAN OF CARE
"Problem: Patient Care Overview  Goal: Discharge Needs Assessment  Outcome: No Change  Received a call from CPA stated pt has been accepted at [facility] for tomorrow 7/10/18. Spoke with  who is going to start working on transportation, but also stated she had some concerns as pt has been obsessively calling the [place] lab and \"harassing the provider who obtained his urine sample.\" She also stated that none of pt mental health medications, including the consta, were detected and stated \"this is sketchy.\" She requested the MAR from [facility], but has not received it yet. CM will call when she has a confirmation for transportation.     Spoke with pt who continues to argue that his lab test came back and \"it proves Arik was telling the truth\" pt continuously talks about the lab test and UA that pt failed .Pt does not want to accept the fact that he will be leaving for a [facility] tomorrow and doesn't understand why he cannot return to the I[facility]S he came from because \"I did nothing wrong there.\" Pt expressed his frustration and anger about the whole situation. Pt asked obsessively \"how long will I be at this [facility] a week and then I can go home? Isn't that about how long someone stays there is a week or at least less than 30 days? Explained to pt is was different for everyone and I could not give pt a time frame. Pt questioned his plan after the [facility] if it would be home or another IRTS - encouraged pt to contact his  regarding the plan for after the [facility]. Pt is not happy with his .       15:07 -  has not called with a time for  tomorrow for pt.   "

## 2018-07-09 NOTE — PLAN OF CARE
Face to face end of shift report received from Zoraida CASSIDY RN. Rounding completed. Patient observed in room laying in bed appears to be sleeping.     Martina Arteaga  7/9/2018  7:43 AM

## 2018-07-09 NOTE — PLAN OF CARE
Problem: Patient Care Overview  Goal: Individualization & Mutuality  Pt will sleep 6-8 hours per night by 7/6/18.  Pt will follow treatment team recommendations while on the unit.  Patient may wean to open unit as long as behavior remains in control.      Outcome: Improving  Slept all noc without issue - approx 8 hours

## 2018-07-09 NOTE — PLAN OF CARE
Problem: Patient Care Overview  Goal: Team Discussion  Team Plan:   BEHAVIORAL TEAM DISCUSSION    Participants: Enda Rao NP, Sharlene Navas NP, Joann Moon NP, Adriana Herman SW, Tammy Pimentel  RN, Kelly Huynh RN, Shahla Quiroz [employer], Vivienne Beebe OT, Sharlene Krueger OT  Progress: good  Continued Stay Criteria/Rationale: DC to [facility] tomorrow  Medical/Physical: None known  Precautions:   Behavioral Orders   Procedures     Code 1 - Restrict to Unit     Routine Programming     As clinically indicated     Self Injury Precaution     Bathroom door to remain locked until after provider evaluation     Status 15     Every 15 minutes.     Plan: DC to [facility] tomorrow  Rationale for change in precautions or plan: None

## 2018-07-09 NOTE — PROGRESS NOTES
Face to face end of shift report received from AUTUMN Dalal. Rounding completed. Patient observed. Lying in supine position - is awake in bed - denies pain or discomfort - denies hunger or thirst but thanks staff for asking.  Will continue to monitor for sleep.      Angeli Major  7/9/2018  1:19 AM

## 2018-07-10 VITALS
OXYGEN SATURATION: 97 % | RESPIRATION RATE: 18 BRPM | WEIGHT: 294.4 LBS | DIASTOLIC BLOOD PRESSURE: 82 MMHG | TEMPERATURE: 96.9 F | HEART RATE: 88 BPM | SYSTOLIC BLOOD PRESSURE: 129 MMHG

## 2018-07-10 PROCEDURE — 25000132 ZZH RX MED GY IP 250 OP 250 PS 637: Performed by: NURSE PRACTITIONER

## 2018-07-10 RX ADMIN — VITAMIN D, TAB 1000IU (100/BT) 2000 UNITS: 25 TAB at 08:25

## 2018-07-10 RX ADMIN — LEVOTHYROXINE SODIUM 25 MCG: 25 TABLET ORAL at 06:43

## 2018-07-10 NOTE — PLAN OF CARE
Gilda from [facility] called to state patient will have a   at 0930 tomorrow 7/10/18.

## 2018-07-10 NOTE — DISCHARGE SUMMARY
"Psychiatric Discharge Summary    Arik Arredondo MRN# [id number]   Age: 28 year old YOB: 1990     Date of Admission:  7/4/2018  Date of Discharge:  7/10/2018  Admitting Physician:  Luis Cantu MD  Discharge Physician:  Joann Moon CNP         Event Leading to Hospitalization and Hospital Stay   Admission: Arik Arredondo is a 28 year old single  male who was brought to the [facility] ED after his provisional discharge was revoked at a court hearing earlier in the day. He has reportedly been treatment noncompliant. He was committed MI/CD through [place] and discharged to [facility], where he has been for the last 3 weeks. He is reportedly refusing to participate in CD treatment and refusing to sign treatment planning releases. He has been increasingly paranoid, believing that staff are drugging him and that there are people tracking his phone. Last week his utox was positive for marijuana, this week it was positive for opiates. He adamantly denies that he has been using any drugs.     Arik spends much of our conversation today explaining \"his side of the story\". He tells me that he was dehydrated when they tested him last week, so his utox being positive for THC would be expected. He does report last use at the beginning of May. He then states that his utox was positive for opiates \"because I was eating poppy seed bagels. I read that if you eat 3 of them it can give you a false positive. I've done a lot of research on this\". This urine was sent for confirmatory testing. He states he does not feel that he needs to be in CD treatment. Records show that he did not complete the intake for outpatient CD treatment even though it was rescheduled after the first time he did not complete it. He tells me that he thought that maybe he wasn't court ordered to do it and wanted to \"check into it\" first. He notes that he has been talking to someone to try to get his " "Rule 25 changed so that it did not recommend treatment and maybe he could just go to AA or NA meetings.      He reports that he has been feeling more depressed and has been \"crying every day\" since he was confronted about his positive utox results, as he is adamant that he has not used any drugs. Believes that \"maybe someone put something in my coffee\". He denies any suicidal or homicidal ideation. He denies hallucinations or paranoia. He states that he has never had hallucinations before. Review of records shows that he has presented to the ED in the past with paranoia about a chip that was implanted in his brain, though unclear whether he was under the influence of any substances. He is currently prescribed Risperdal Consta, which I believe he has been taking. Will need to verify when this is due next.     Hospital course: Arik was admitted to the behavioral health unit on a court hold after his provisional discharge was revoked through [facility].  recommended discharge to a [facility] given his possible relapse on substances and treatment noncompliance. Throughout his stay Arik was quite perseverative about his urine drug screen results, adamantly denying that he used any kind of illicit substances. He would argue this point with any peers or staff that would listen. He did attend group programming and was visible in the milieu. Behavior was appropriate and controlled. PTA medications were resumed, he received his Risperdal Consta injection on 7/6/18. He denied any SI or depression on discharge, did admit to mild anxiety related to [facility] placement. He will be transported via  to [facility] today.         At time of discharge, there is no evidence that patient is in immediate danger of self or others.        DIagnoses:   Schizoaffective disorder- bipolar type (by history)  R/o substance-induced psychosis  Hx of opioid use disorder  Hx of marijuana use disorder         Labs:     Preadmission " labs reviewed and in paper chart. Utox- negative. ETOH negative. CBC, TSH and CMP are unremarkable.           Discharge Medications:     Current Discharge Medication List      CONTINUE these medications which have CHANGED    Details   benztropine (COGENTIN) 0.5 MG tablet Take 2 tablets (1 mg) by mouth 2 times daily as needed      hydrOXYzine (VISTARIL) 25 MG capsule Take 2 capsules (50 mg) by mouth every 4 hours as needed  Qty: 120 capsule      QUEtiapine (SEROQUEL) 50 MG tablet Take 1 tablet (50 mg) by mouth 3 times daily as needed  Qty: 120 tablet      risperiDONE microspheres (RISPERDAL CONSTA) 25 MG injection Inject 2 mLs (25 mg) into the muscle every 14 days Last received 7/6/18  Qty: 2 each         CONTINUE these medications which have NOT CHANGED    Details   albuterol (PROAIR HFA/PROVENTIL HFA/VENTOLIN HFA) 108 (90 Base) MCG/ACT Inhaler Inhale 1 puff into the lungs every 4 hours as needed for shortness of breath / dyspnea      Levothyroxine Sodium (LEVOTHROID PO) Take 25 mcg by mouth      VITAMIN D, CHOLECALCIFEROL, PO Take 2,000 Units by mouth daily             Justification for dual anti-psychotic use: is taking Risperdal Consta as a scheduled medication. Has Seroquel available as a PRN medication to treat intermittent agitation. He was admitted on this medication combination.       Psychiatric Examination:   Appearance:  awake, alert, adequately groomed and appeared as age stated  Attitude:  cooperative, pleasant though can be argumentative  Eye Contact:  good  Mood:  better  Affect:  appropriate and in normal range  Speech:  clear, coherent and rambling  Psychomotor Behavior:  no evidence of tardive dyskinesia, dystonia, or tics  Thought Process:  linear and goal oriented, rationalizing, perseverative  Associations:  no loose associations  Thought Content:  no evidence of suicidal ideation or homicidal ideation and no evidence of psychotic thought  Insight:  limited  Judgment:  fair  Oriented to:  time,  person, and place  Attention Span and Concentration:  intact  Recent and Remote Memory:  intact  Fund of Knowledge: appropriate for education  Muscle Strength and Tone: normal  Gait and Station: Normal  Perception: no perceptual disturbances noted       Discharge Plan:   Patient will discharge to [facility] per commitment    Psychiatry Follow-up:      [facility]  - Michelle - [phone] ext [phone]   CD  - Heather Penfield  [address] [place] [address]  Phone: [phone]  Fax: [phone]    Attestation:  The patient has been seen and evaluated by me,  Joann Moon NP         Discharge Services Provided:    40 minutes spent on discharge services, including:  Final examination of patient.  Review and discussion of Hospital stay.  Instructions for continued outpatient care/goals.  Preparation of discharge records.  Preparation of medications refills and new prescriptions.

## 2018-07-10 NOTE — PLAN OF CARE
Face to face end of shift report received from Angeli ROLON RN. Rounding completed. Patient observed laying in bed with eyes closed, left side, non-labored breathing.     Letty Ackerman  7/10/2018  7:54 AM

## 2018-07-10 NOTE — DISCHARGE INSTRUCTIONS
Behavioral Discharge Planning and Instructions    Summary: Arik was admitted to  after a revocation of his provisional discharge     Main Diagnosis: Schizoaffective disorder- bipolar type (by history), R/o substance-induced psychosis, Hx of opioid use disorder, Hx of marijuana use disorder    Major Treatments, Procedures and Findings: Stabilize with medications, connect with community programs.    Symptoms to Report: feeling more aggressive, increased confusion, losing more sleep, mood getting worse or thoughts of suicide    Lifestyle Adjustment: Take all medications as prescribed, meet with doctor/ medication provider, out patient therapist, , and [facility] worker as scheduled. Abstain from alcohol or any unprescribed drugs.    IF YOU HAVE ANY QUESTIONS/CONCERNS REGARDING YOUR RECENT STAY AT [facility] PLEASE CALL [phone] AND ASK FOR A NURSE.    Psychiatry Follow-up:     [facility]    - Michelle - [phone] ext [phone]     - Heather Penfield  [address] [place] [address]  Phone: [phone]  Fax: [phone]    Resources:   Crisis Intervention: 125.470.7649 or 815-852-1601 (TTY: 212.835.7239).  Call anytime for help.  National [place] on Mental Illness (www.[place].héctor.org): 390.810.7033 or 461-756-0334.  Alcoholics Anonymous (www.alcoholics-anonymous.org): Check your phone book for your local chapter.  Suicide Awareness Voices of Education (SAVE) (www.save.org): 998-851-FWWL (4272)  National Suicide Prevention Line (www.mentalhealthmn.org): 732-319-GGVC (0600)  Mental Health Consumer/Survivor Network of [place] (www.mhcsn.net): 592.844.5909 or 415-831-0576  Mental Health Association of [place] (www.mentalhealth.org): 908.482.6819 or 229-926-1420    General Medication Instructions:   See your medication sheet(s) for instructions.   Take all medicines as directed.  Make no changes unless your doctor suggests them.   Go to all your doctor  visits.  Be sure to have all your required lab tests. This way, your medicines can be refilled on time.  Do not use any drugs not prescribed by your doctor.  Avoid alcohol.

## 2018-07-10 NOTE — PLAN OF CARE
Face to face end of shift report received from AUTUMN Sotelo. Rounding completed. Patient observed. Sitting in lounge area with peers - listening to radio and playing card game - denies pain or discomfort.    Angeli Major  7/10/2018  1:10 AM

## 2018-07-10 NOTE — PLAN OF CARE
Problem: Patient Care Overview  Goal: Individualization & Mutuality  Pt will sleep 6-8 hours per night by 7/6/18.  Pt will follow treatment team recommendations while on the unit.          Outcome: Adequate for Discharge Date Met: 07/10/18  Discharge Note    Patient Discharged to [facility] on 7/10/2018 9:55 AM via Private Car accompanied by  and Rad[facility] staff to door.     Patient informed of discharge instructions in AVS. Patient verbalizes understanding and denies having any questions pertaining to AVS. Patient stable at time of discharge. Patient denies SI, HI, and thoughts of self harm at time of discharge. All personal belongings returned to patient. Nurse to nurse report given and current MAR faxed to [facility] this shift.     Letyt Ackerman  7/10/2018  9:57 AM

## 2018-07-10 NOTE — PROGRESS NOTES
"[facility]  Psychiatric Progress Note      Impression:    This is a 28 year old yo male with a history of schizoaffective disorder and substance abuse.    Arik is just coming from group when I speak to him today. He tells me that he got the results back from his urine test completed at [place] \"it was positive for morphine, which could have come from the poppy seeds. They can't say that it wasn't poppy seeds, so I don't know why my  is still saying that she thinks that I used\". Remains fixated on this and will talk about it to anyone that will listen. He states that he is still hopeful that he will be able to return to the [facility] facility he was at and not have to go to inpatient treatment or a [facility]. Has been sleeping well at night and attending groups during the day. Will likely discharge to [facility] when bed available.    Educated regarding medication indications, risks, benefits, side effects, contraindications and possible interactions. Verbally expressed understanding.        DIagnoses:   Schizoaffective disorder- bipolar type (by history)  R/o substance-induced psychosis  Hx of opioid use disorder  Hx of marijuana use disorder      Attestation:  Patient has been seen and evaluated by me,  Joann Moon NP          Interim History:   The patient's care was discussed with the treatment team and chart notes were reviewed.          Medications:     Current Facility-Administered Medications Ordered in Epic   Medication Dose Route Frequency Last Rate Last Dose     acetaminophen (TYLENOL) tablet 650 mg  650 mg Oral Q4H PRN   650 mg at 07/04/18 0201     albuterol (PROAIR HFA/PROVENTIL HFA/VENTOLIN HFA) Inhaler 1 puff  1 puff Inhalation Q4H PRN         alum & mag hydroxide-simethicone (MYLANTA ES/MAALOX  ES) suspension 30 mL  30 mL Oral Q4H PRN         benztropine (COGENTIN) tablet 1 mg  1 mg Oral BID PRN         cholecalciferol (vitamin D3) tablet 2,000 Units  2,000 Units Oral Daily   2,000 " "Units at 07/06/18 0801     hydrOXYzine (ATARAX) tablet 25-50 mg  25-50 mg Oral Q4H PRN         levothyroxine (SYNTHROID/LEVOTHROID) tablet 25 mcg  25 mcg Oral Daily   25 mcg at 07/06/18 0604     magnesium hydroxide (MILK OF MAGNESIA) suspension 30 mL  30 mL Oral At Bedtime PRN         nicotine polacrilex (NICORETTE) gum 2-4 mg  2-4 mg Buccal Q1H PRN         OLANZapine (zyPREXA) tablet 10 mg  10 mg Oral TID PRN        Or     OLANZapine (zyPREXA) injection 10 mg  10 mg Intramuscular TID PRN         QUEtiapine (SEROquel) tablet 50 mg  50 mg Oral TID PRN         risperiDONE microspheres (risperDAL CONSTA) injection 25 mg  25 mg Intramuscular Q14 Days   25 mg at 07/06/18 0849     No current [facility]-ordered outpatient prescriptions on file.          10 point ROS reviewed- denies any current concerns       Allergies:   No Known Allergies         Psychiatric Examination:   /54  Pulse 69  Temp 97  F (36.1  C) (Tympanic)  Resp 18  Wt 133.5 kg (294 lb 6.4 oz)  SpO2 96%  Weight is 294 lbs 6.4 oz  There is no height or weight on file to calculate BMI.    Appearance: awake. Alert, dressed in hospital scrubs, casually groomed  Attitude: cooperative, pleasant  Eye Contact: good  Mood: reports \"frustrated\", anxious about commitment  Affect:  Mood congruent  Speech:  clear, coherent, rambling at times  Psychomotor Behavior:  no evidence of tardive dyskinesia, dystonia, or tics  Thought Process:  linear and goal oriented, rationalizing, obsessive and perseverative  Associations:  no loose associations  Thought Content:  no evidence of suicidal ideation or homicidal ideation and no evidence of psychotic thought  Insight:  limited  Judgment:  fair  Oriented to:  time, person, and place  Attention Span and Concentration:  intact  Recent and Remote Memory:  intact  Fund of Knowledge: low-normal  Muscle Strength and Tone: normal  Gait and Station: Normal           Labs:   No results found for this or any previous visit (from the " past 24 hour(s)).           Plan:   Risperdal Consta 25 mg IM given 7/6, and then every 2 weeks  Continue other medications  Provisional discharge revoked  Will discharge to [facility]- on priority list    ELOS: unclear, will go to [facility] when bed available as he has failed less restrictive options

## 2018-07-10 NOTE — PLAN OF CARE
"Problem: Patient Care Overview  Goal: Discharge Needs Assessment  Outcome: No Change  Pt continued to express his disappointment this morning with the situation, but states \"i know I can't change it so I'm just gonna go with it and hope I am not there long.\" Pt was encouraged to try and remain positive and to continue to be cooperative at the [facility] as well. Pt stated he would and hopes to be able to return home soon.       "

## 2019-05-31 ENCOUNTER — HOSPITAL ENCOUNTER (EMERGENCY)
Dept: HOSPITAL 27 - EMS | Age: 29
LOS: 1 days | Discharge: HOME | End: 2019-06-01
Payer: SELF-PAY

## 2019-05-31 ENCOUNTER — HOSPITAL ENCOUNTER (EMERGENCY)
Dept: HOSPITAL 27 - EMS | Age: 29
Discharge: HOME | End: 2019-05-31
Payer: SELF-PAY

## 2019-05-31 VITALS — HEIGHT: 73 IN | WEIGHT: 240.3 LBS | BODY MASS INDEX: 31.85 KG/M2

## 2019-05-31 VITALS — HEIGHT: 69 IN | BODY MASS INDEX: 40.08 KG/M2 | WEIGHT: 270.57 LBS

## 2019-05-31 VITALS — DIASTOLIC BLOOD PRESSURE: 90 MMHG | SYSTOLIC BLOOD PRESSURE: 139 MMHG

## 2019-05-31 DIAGNOSIS — R51: Primary | ICD-10-CM
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
## 2019-05-31 DIAGNOSIS — F19.10: ICD-10-CM

## 2019-05-31 DIAGNOSIS — S09.90XA: Primary | ICD-10-CM

## 2019-05-31 DIAGNOSIS — F17.210: ICD-10-CM
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
## 2019-05-31 PROCEDURE — 70450 CT HEAD/BRAIN W/O DYE: CPT

## 2019-06-01 VITALS — SYSTOLIC BLOOD PRESSURE: 129 MMHG | DIASTOLIC BLOOD PRESSURE: 87 MMHG

## 2022-12-27 NOTE — PROGRESS NOTES
JACOB BARONE  Patient requested visit with .  Patient was part of a small group who had Holy Communion served to them. Patient noted that he specifically came for prayer asking that he be able to get the people who put him in here to change their minds concerning his lab results which apparently showed some level of drugs.  This he adamantly claims was due to eating a poppy seed bun before taking the test.  Patient is obviously upset and believes that he was drug free.  Patient did not take any more time for an explanation or life history.   No

## 2023-12-30 ENCOUNTER — HOSPITAL ENCOUNTER (EMERGENCY)
Facility: CLINIC | Age: 33
Discharge: HOME | End: 2023-12-30
Attending: NURSE PRACTITIONER | Admitting: NURSE PRACTITIONER
Payer: MEDICARE

## 2023-12-30 VITALS
DIASTOLIC BLOOD PRESSURE: 87 MMHG | RESPIRATION RATE: 20 BRPM | SYSTOLIC BLOOD PRESSURE: 144 MMHG | HEART RATE: 88 BPM | OXYGEN SATURATION: 94 %

## 2023-12-30 DIAGNOSIS — Z91.148 NONCOMPLIANCE WITH MEDICATION REGIMEN: ICD-10-CM

## 2023-12-30 DIAGNOSIS — F20.9 CHRONIC SCHIZOPHRENIA (H): ICD-10-CM

## 2023-12-30 DIAGNOSIS — H93.13 TINNITUS, BILATERAL: ICD-10-CM

## 2023-12-30 PROCEDURE — 99283 EMERGENCY DEPT VISIT LOW MDM: CPT | Performed by: NURSE PRACTITIONER

## 2023-12-30 RX ORDER — CALCIUM CARBONATE 500 MG/1
1000 TABLET, CHEWABLE ORAL ONCE
Status: DISCONTINUED | OUTPATIENT
Start: 2023-12-30 | End: 2023-12-31 | Stop reason: HOSPADM

## 2023-12-31 ENCOUNTER — HOSPITAL ENCOUNTER (EMERGENCY)
Facility: CLINIC | Age: 33
Discharge: NURSING, REHAB OR LONG-TERM CARE | End: 2023-12-31
Attending: EMERGENCY MEDICINE | Admitting: EMERGENCY MEDICINE
Payer: MEDICARE

## 2023-12-31 VITALS
TEMPERATURE: 97.8 F | DIASTOLIC BLOOD PRESSURE: 99 MMHG | RESPIRATION RATE: 20 BRPM | OXYGEN SATURATION: 98 % | HEART RATE: 82 BPM | SYSTOLIC BLOOD PRESSURE: 147 MMHG | WEIGHT: 294 LBS

## 2023-12-31 DIAGNOSIS — H93.13 TINNITUS OF BOTH EARS: ICD-10-CM

## 2023-12-31 DIAGNOSIS — R68.2 DRY MOUTH: ICD-10-CM

## 2023-12-31 DIAGNOSIS — R20.8 SENSATION OF FOREIGN BODY IN SKIN: ICD-10-CM

## 2023-12-31 DIAGNOSIS — F22 DELUSIONAL DISORDER, PERSECUTORY TYPE (H): ICD-10-CM

## 2023-12-31 DIAGNOSIS — Z91.89 AT RISK FOR SLEEP APNEA: ICD-10-CM

## 2023-12-31 DIAGNOSIS — F22 PARANOIA (H): ICD-10-CM

## 2023-12-31 PROCEDURE — 99283 EMERGENCY DEPT VISIT LOW MDM: CPT | Performed by: EMERGENCY MEDICINE

## 2023-12-31 ASSESSMENT — ACTIVITIES OF DAILY LIVING (ADL): ADLS_ACUITY_SCORE: 35

## 2023-12-31 NOTE — DISCHARGE INSTRUCTIONS
Follow-up with your therapist as needed.    Return to the emergency department if you are feeling suicidal or wanting to hurt yourself.

## 2023-12-31 NOTE — ED PROVIDER NOTES
History     Chief Complaint   Patient presents with    Tinnitus     HPI  Arik Arredondo is a 33 year old male with history of schizoaffective disorder who presents via EMS from his group home with complaints of electric shocks throughout his body, bone pain, and tinnitus in both ears. Patient called 9-1-1 asking with request to be brought to [facility] to go to hospital to be evaluated. They informed him that they could not transport him that far and he agreed to come here for evaluation. Patient  believes the tinnitus is caused by a device that has been implanted into his brain by the government. He also reports electric shocks throughout his body that is very painful.  Symptoms have been going on for years.  Patient denies taking any regular medications other than a multivitamin and omeprazole. He refuses to take any psychiatric medications.  He does not feel that he has schizophrenia.  He reports a lot of stress from years of harrassment by the police. He is requesting TUMS for acid reflux.  He denies any current suicidal ideation.  He denies wanting to hurt himself.  He denies wanting to hurt anyone else.  Denies fever or chills. Denies nausea or vomiting. Feels that he urinates an abnormal amount.  Nonsmoker.  Denies alcohol use.   Uses marijuana occasionally, not recently.  Denies illicit drug use.    Contacts from pt's facility information:    Current facility: [facility] at [address] [place], [place] [address]st [facility] in [place], [place].  Contact: House Supervisor Ginger Danielle at [phone]  Edna Lara, Crisis  at [phone]  Additional phone number for both: [phone]    [facility]  [phone] and [email or web address]    [facility] [place] [facility]  [phone] and [email or web address].     Allergies:  No Known Allergies    Problem List:    Patient Active Problem List    Diagnosis Date Noted    Involuntary  commitment 07/04/2018     Priority: Medium        Past Medical History:    No past medical history on file.    Past Surgical History:    No past surgical history on file.    Family History:    No family history on file.    Social History:  Marital Status:  Single [1]        Medications:    albuterol (PROAIR HFA/PROVENTIL HFA/VENTOLIN HFA) 108 (90 Base) MCG/ACT Inhaler  benztropine (COGENTIN) 0.5 MG tablet  hydrOXYzine (VISTARIL) 25 MG capsule  Levothyroxine Sodium (LEVOTHROID PO)  QUEtiapine (SEROQUEL) 50 MG tablet  risperiDONE microspheres (RISPERDAL CONSTA) 25 MG injection  VITAMIN D, CHOLECALCIFEROL, PO          Review of Systems  As mentioned above in the history present illness. All other systems were reviewed and are negative.    Physical Exam   BP: (!) 144/87  Pulse: 88  Resp: 20  SpO2: 94 %      Physical Exam    GENERAL APPEARANCE: alert and oriented. NAD. Calm and cooperative.  RESP: lungs clear to auscultation - no rales, rhonchi or wheezes  CV: regular rates and rhythm, normal S1 S2, no murmur noted      ED Course                 Procedures              No results found for this or any previous visit (from the past 24 hour(s)).    Medications   calcium carbonate (TUMS) chewable tablet 1,000 mg (has no administration in time range)       Assessments & Plan (with Medical Decision Making)   I offered to check blood work and urinalysis. Patient initially wanted to do this but then declined. He has had some food to eat.  He does not appear to be in imminent danger to himself or others. I explained to patient that there is no acute/emergent concern at this time. He was discharged with his group home staff back to his group home.      Discharge Medication List as of 12/30/2023  9:59 PM          Final diagnoses:   Tinnitus, bilateral   Noncompliance with medication regimen   Chronic schizophrenia (H)       12/30/2023   [facility] EMERGENCY DEPT       Keli, HU Randle CNP  12/30/23  [id number]

## 2023-12-31 NOTE — ED TRIAGE NOTES
"     Triage Assessment (Adult)       Row Name 12/30/23 1262          Triage Assessment    Airway WDL WDL        Respiratory WDL    Respiratory WDL WDL                   C/O pain in his arms Caused by \"electrical shocks\" in his body.  As well as intense ringing in his ears.  EMS reports he has called and reported this to them multiple times in the last week.  PT has a history of schizophrenia and bipolar disease that he is not medicated for.   "

## 2023-12-31 NOTE — ED PROVIDER NOTES
"  History     Chief Complaint   Patient presents with    Mental Health Problem     HPI  History per patient, medical records    This is a 33-year-old male, history of schizoaffective disorder with persistent delusions, multidrug abuse, presenting with mental health problem.  Patient resides in a group home and has been calling 911 numerous times throughout the week.  Today he asked to be brought to this ED for concerns of electric shocks going through his body, ringing in the ears.  He believes that the ear ringing is caused by a device that is implanted in his brain by \"them\".  He has had a lot of his symptoms for many years.  He reports being in penitentiary, mental health facilities, and being \"forced to take psych medications\".  He notes that he has been persecuted and harassed.  He talks a lot about \"them\".  He also reports that he was \"labeled a pedophile because he is a big camille with a small penis\".  At times, patient talks about standard medical concerns such as sleep apnea but then he will talk about the wires that are going from his teeth and monitoring him.  He reports having CTs and MRIs that did not show anything because \"they\" know how to hide the equipment.  He has refused to voluntarily take psychiatric medications.  Review of medical record shows that patient has been seen innumerable times throughout the years for paranoia, delusions, psychosis.  He denies any thoughts or plans to harm himself or others.  Patient was seen in this ED yesterday for the same symptoms.  He was able to eat a couple of sandwiches, declined blood draws, and was discharged.  He was seen at [facility] on 12/24/2023 with the same symptoms.  Apparently he has only lived in this group home for 1 week.    ED note yesterday:  MOSES  Arik Arredondo is a 33 year old male with history of schizoaffective disorder who presents via EMS from his group home with complaints of electric shocks throughout his body, bone pain, and tinnitus in " both ears. Patient called 9-1-1 asking with request to be brought to [place] to go to hospital to be evaluated. They informed him that they could not transport him that far and he agreed to come here for evaluation. Patient  believes the tinnitus is caused by a device that has been implanted into his brain by the government. He also reports electric shocks throughout his body that is very painful.  Symptoms have been going on for years.  Patient denies taking any regular medications other than a multivitamin and omeprazole. He refuses to take any psychiatric medications.  He does not feel that he has schizophrenia.  He reports a lot of stress from years of harrassment by the police. He is requesting TUMS for acid reflux.  He denies any current suicidal ideation.  He denies wanting to hurt himself.  He denies wanting to hurt anyone else.  Denies fever or chills. Denies nausea or vomiting. Feels that he urinates an abnormal amount.  Nonsmoker.  Denies alcohol use.   Uses marijuana occasionally, not recently.  Denies illicit drug use.     Contacts from pt's facility information:    Current facility: [facility] at [address] [place], [place] [address]st [facility] in [place], [place].  Contact: House Supervisor Ginger Danielle at [phone]  [facility]na Lara Crisis  at [phone]  Additional phone number for both: [phone]    [facility]  [phone] and [email or web address]    [facility]  [phone] and [email or web address].     Allergies:  No Known Allergies    Problem List:    Patient Active Problem List    Diagnosis Date Noted    Involuntary commitment 07/04/2018     Priority: Medium        Past Medical History:    No past medical history on file.    Past Surgical History:    No past surgical history on file.    Family History:    No family history on file.    Social History:  Marital Status:  Single [1]     "    Medications:    albuterol (PROAIR HFA/PROVENTIL HFA/VENTOLIN HFA) 108 (90 Base) MCG/ACT Inhaler  benztropine (COGENTIN) 0.5 MG tablet  hydrOXYzine (VISTARIL) 25 MG capsule  Levothyroxine Sodium (LEVOTHROID PO)  QUEtiapine (SEROQUEL) 50 MG tablet  risperiDONE microspheres (RISPERDAL CONSTA) 25 MG injection  VITAMIN D, CHOLECALCIFEROL, PO          Review of Systems  All other ROS reviewed and are negative or non-contributory except as stated in HPI.     Physical Exam   BP: (!) 147/99  Pulse: 82  Temp: 97.8  F (36.6  C)  Resp: 20  Weight: 133.4 kg (294 lb)  SpO2: 98 %      Physical Exam  Vitals and nursing note reviewed.   Constitutional:       Comments: Large muscular gentleman sitting in the bed.  He initially was speaking quietly but became more agitated the longer we conversed to the point that he picked up a sausage off his tray, stated \"is this all the protein you have\" and then threw it in a bag on the floor.   HENT:      Head: Normocephalic.   Eyes:      Extraocular Movements: Extraocular movements intact.   Cardiovascular:      Rate and Rhythm: Normal rate.   Pulmonary:      Effort: Pulmonary effort is normal.   Musculoskeletal:         General: Normal range of motion.      Cervical back: Normal range of motion.   Skin:     General: Skin is warm and dry.   Neurological:      General: No focal deficit present.      Mental Status: He is alert.   Psychiatric:         Mood and Affect: Affect is labile.         Behavior: Behavior is cooperative.         Thought Content: Thought content is paranoid and delusional.         Judgment: Judgment is impulsive.      Comments: No thoughts or plans to harm self or others.  Is looking for someone to help him with the constant ringing in the ears and to get the \"equipment out of his body\" that \"they placed to monitor him\".         ED Course (with Medical Decision Making)    Pt seen and examined by me.  RN and EPIC notes reviewed.       Patient presenting from group home " looking to get help for some delusional ideas of implants in his body.  He is paranoid.  His affect was a little labile.  He has been seen numerous times for the same things in the past.  He declines taking any psychiatric medications and does not believe he is schizoaffective.    Patient does start to escalate somewhat while I was in the room.  I left for him to eat his lunch.  I spoke with his .  She notes that he is his own power of  and can therefore call 911 at any time if he so desires.    I do not think there is anything emergent that needs to be done at this point.  He is not in obvious harm to himself or others.  Patient was discharged back to his group home.      Procedures    No results found for this or any previous visit (from the past 24 hour(s)).    Medications - No data to display    Assessments & Plan     I have reviewed the findings, diagnosis, plan and need for follow up with the patient.  Discharge Medication List as of 12/31/2023  8:35 AM          Final diagnoses:   Paranoia (H)   Tinnitus of both ears   Delusional disorder, persecutory type (H)   Dry mouth   At risk for sleep apnea   Sensation of foreign body in skin     Disposition: Patient discharged home in stable condition.  Plan as above.  Return for concerns.     Note: Chart documentation done in part with Dragon Voice Recognition software. Although reviewed after completion, some word and grammatical errors may remain.   12/31/2023   [facility] EMERGENCY DEPT       Kaylah Doe MD  12/31/23 0325